# Patient Record
Sex: FEMALE | Race: WHITE
[De-identification: names, ages, dates, MRNs, and addresses within clinical notes are randomized per-mention and may not be internally consistent; named-entity substitution may affect disease eponyms.]

---

## 2017-01-25 ENCOUNTER — HOSPITAL ENCOUNTER (OUTPATIENT)
Dept: HOSPITAL 80 - BHFA | Age: 72
End: 2017-01-25
Attending: INTERNAL MEDICINE
Payer: COMMERCIAL

## 2017-01-25 DIAGNOSIS — I48.0: Primary | ICD-10-CM

## 2017-04-18 ENCOUNTER — HOSPITAL ENCOUNTER (OUTPATIENT)
Dept: HOSPITAL 80 - BHFA | Age: 72
End: 2017-04-18
Attending: INTERNAL MEDICINE
Payer: COMMERCIAL

## 2017-04-18 DIAGNOSIS — I48.0: Primary | ICD-10-CM

## 2017-05-24 ENCOUNTER — HOSPITAL ENCOUNTER (OUTPATIENT)
Dept: HOSPITAL 80 - BHFA | Age: 72
End: 2017-05-24
Attending: INTERNAL MEDICINE
Payer: COMMERCIAL

## 2017-05-24 DIAGNOSIS — R94.39: Primary | ICD-10-CM

## 2017-05-24 PROCEDURE — 78452 HT MUSCLE IMAGE SPECT MULT: CPT

## 2017-05-24 PROCEDURE — 93017 CV STRESS TEST TRACING ONLY: CPT

## 2017-05-24 PROCEDURE — A9500 TC99M SESTAMIBI: HCPCS

## 2017-06-28 ENCOUNTER — HOSPITAL ENCOUNTER (OUTPATIENT)
Dept: HOSPITAL 80 - BHFA | Age: 72
End: 2017-06-28
Attending: INTERNAL MEDICINE
Payer: COMMERCIAL

## 2017-06-28 DIAGNOSIS — I48.0: Primary | ICD-10-CM

## 2017-07-25 ENCOUNTER — HOSPITAL ENCOUNTER (OUTPATIENT)
Dept: HOSPITAL 80 - FIMAGING | Age: 72
End: 2017-07-25
Attending: INTERNAL MEDICINE
Payer: COMMERCIAL

## 2017-07-25 DIAGNOSIS — Z12.31: Primary | ICD-10-CM

## 2017-07-25 PROCEDURE — G0202 SCR MAMMO BI INCL CAD: HCPCS

## 2017-08-02 ENCOUNTER — HOSPITAL ENCOUNTER (OUTPATIENT)
Dept: HOSPITAL 80 - FCATH | Age: 72
Discharge: HOME | End: 2017-08-02
Attending: INTERNAL MEDICINE
Payer: COMMERCIAL

## 2017-08-02 DIAGNOSIS — E03.9: ICD-10-CM

## 2017-08-02 DIAGNOSIS — E78.5: ICD-10-CM

## 2017-08-02 DIAGNOSIS — I48.0: ICD-10-CM

## 2017-08-02 DIAGNOSIS — Z45.09: Primary | ICD-10-CM

## 2017-08-02 PROCEDURE — C1764 EVENT RECORDER, CARDIAC: HCPCS

## 2017-08-02 PROCEDURE — 0JPT02Z REMOVAL OF MONITORING DEVICE FROM TRUNK SUBCUTANEOUS TISSUE AND FASCIA, OPEN APPROACH: ICD-10-PCS | Performed by: INTERNAL MEDICINE

## 2017-08-02 PROCEDURE — 0JH602Z INSERTION OF MONITORING DEVICE INTO CHEST SUBCUTANEOUS TISSUE AND FASCIA, OPEN APPROACH: ICD-10-PCS | Performed by: INTERNAL MEDICINE

## 2017-08-02 NOTE — EPPROC
Electrophysiology Procedure Note: 


Procedure - LINQ monitor explant followed by new LINQ monitor implant. 


Indication - AF, to monitor for side effects of meds and also to assess AF 

burden


Procedure performed after IV access and informed consent.  Lidocaine used for 

local anesthetic.  Existing LINQ monitor pocket opened under sterile conditions 

in cardiac cath lab.  Old LINQ monitor explanted.  New LINQ monitor implanted. 

Wound closed with 3 staples.  No complications. 





Patient Problems: 


 Problems











Problem Status Onset


 


Atrial fibrillation and flutter Acute

## 2018-06-01 ENCOUNTER — HOSPITAL ENCOUNTER (OUTPATIENT)
Dept: HOSPITAL 80 - FCATH | Age: 73
Discharge: HOME | End: 2018-06-01
Attending: INTERNAL MEDICINE
Payer: COMMERCIAL

## 2018-06-01 DIAGNOSIS — I48.92: Primary | ICD-10-CM

## 2018-06-01 LAB
INR PPP: 1.42 (ref 0.83–1.16)
PROTHROMBIN TIME: 17.5 SEC (ref 12–15)

## 2018-06-01 PROCEDURE — 5A2204Z RESTORATION OF CARDIAC RHYTHM, SINGLE: ICD-10-PCS | Performed by: INTERNAL MEDICINE

## 2018-06-01 NOTE — PDGENHP
History & Physical


Chief Complaint: Atrial flutter


History of Present Illness: 73-year-old female with known history of atrial 

arrhythmias.  She has been on Pradaxa uninterrupted for year and a half.  She 

has not missed a single dose.  She has previously had a cardioversion at [facility].  She is now symptomatic in atrial flutter with rapid ventricular 

response and 2-1 AV conduction.


Pertinent Past, Social, Family History: Reviewed


Relevant Physical Exam: Tachycardic regular rhythm.  Lungs clear


Cardiorespiratory Assessment: Stable for procedure.

## 2018-06-01 NOTE — POSTANESTH
Post Anesthetic Evaluation


Cardiovascular Status: Similar to Pre-Op Cond


Respiratory Status: Normal, Stable, Similar to Pre-op Cond.


Level of Consciousness/Mental Status: Can Participate in Eval


Pain Control: Adequate, Prn Tx Ordered


Nausea/Vomiting Control: Adequate, Prn Tx Ordered


Complications Possibly Related to Anesthesia: None Noted

## 2018-06-01 NOTE — CPEKG
Heart Rate: 142

RR Interval: 423

P-R Interval: 136

QRSD Interval: 72

QT Interval: 304

QTC Interval: 467

P Axis: 0

QRS Axis: -48

T Wave Axis: 69

EKG Severity - ABNORMAL ECG -

EKG Impression: ATRIAL FLUTTER WITH 2:1 CONDUCTION

EKG Impression: LAD, CONSIDER LEFT ANTERIOR FASCICULAR BLOCK

Electronically Signed By: Braydon Ram 01-Jun-2018 12:56:54
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
## 2018-06-01 NOTE — PDTEE1
JEAN MARIE Cardioversion Procedure


Procedure: electrical cardioversion


Indications: other (Atrial flutter)


Consent: signed and in chart


Anticoagulation: other (Pradaxa)


Procedural Details: 


Pads were placed in anterior-posterior position.  No JEAN MARIE was performed as the 

patient has been on uninterrupted Pradaxa therapy for a year and a half.  

Sedation was provided by Dr. Mcdonald.





Synchronized cardioversion attempt #1: 200J


Results: normal sinus rhythm


Conclusions: successful cardioversion


Patient Problems: 


 Problems











Problem Status Onset


 


Atrial fibrillation and flutter Acute

## 2018-06-05 NOTE — CPEKG
Heart Rate: 79

RR Interval: 759

P-R Interval: 168

QRSD Interval: 88

QT Interval: 368

QTC Interval: 422

P Axis: 43

QRS Axis: -14

T Wave Axis: 22

EKG Severity - NORMAL ECG -

EKG Impression: SINUS RHYTHM

EKG Impression: COMPARED WITH 6/1/2018 AT 10:24 A.M. A.M. SINUS RHYTHM HAS REPLACED ATRIAL

EKG Impression: FLUTTER

Electronically Signed By: Jennie Reeder 05-Jun-2018 20:29:24

## 2018-06-27 ENCOUNTER — HOSPITAL ENCOUNTER (OUTPATIENT)
Dept: HOSPITAL 80 - BHFA | Age: 73
End: 2018-06-27
Attending: INTERNAL MEDICINE
Payer: COMMERCIAL

## 2018-06-27 DIAGNOSIS — I48.0: Primary | ICD-10-CM
